# Patient Record
Sex: FEMALE | Race: BLACK OR AFRICAN AMERICAN | NOT HISPANIC OR LATINO
[De-identification: names, ages, dates, MRNs, and addresses within clinical notes are randomized per-mention and may not be internally consistent; named-entity substitution may affect disease eponyms.]

---

## 2017-03-06 PROBLEM — Z00.00 ENCOUNTER FOR PREVENTIVE HEALTH EXAMINATION: Status: ACTIVE | Noted: 2017-03-06

## 2017-05-16 ENCOUNTER — TRANSCRIPTION ENCOUNTER (OUTPATIENT)
Age: 42
End: 2017-05-16

## 2017-09-29 ENCOUNTER — TRANSCRIPTION ENCOUNTER (OUTPATIENT)
Age: 42
End: 2017-09-29

## 2017-10-03 ENCOUNTER — TRANSCRIPTION ENCOUNTER (OUTPATIENT)
Age: 42
End: 2017-10-03

## 2019-03-28 ENCOUNTER — OUTPATIENT (OUTPATIENT)
Dept: OUTPATIENT SERVICES | Facility: HOSPITAL | Age: 44
LOS: 1 days | Discharge: HOME | End: 2019-03-28

## 2019-03-28 DIAGNOSIS — R00.2 PALPITATIONS: ICD-10-CM

## 2019-03-28 DIAGNOSIS — Z00.00 ENCOUNTER FOR GENERAL ADULT MEDICAL EXAMINATION WITHOUT ABNORMAL FINDINGS: ICD-10-CM

## 2019-12-13 ENCOUNTER — TRANSCRIPTION ENCOUNTER (OUTPATIENT)
Age: 44
End: 2019-12-13

## 2020-04-25 ENCOUNTER — MESSAGE (OUTPATIENT)
Age: 45
End: 2020-04-25

## 2020-11-10 ENCOUNTER — TRANSCRIPTION ENCOUNTER (OUTPATIENT)
Age: 45
End: 2020-11-10

## 2021-10-04 ENCOUNTER — OUTPATIENT (OUTPATIENT)
Dept: OUTPATIENT SERVICES | Facility: HOSPITAL | Age: 46
LOS: 1 days | Discharge: HOME | End: 2021-10-04

## 2021-10-04 DIAGNOSIS — Z20.828 CONTACT WITH AND (SUSPECTED) EXPOSURE TO OTHER VIRAL COMMUNICABLE DISEASES: ICD-10-CM

## 2021-10-05 LAB — SARS-COV-2 RNA SPEC QL NAA+PROBE: SIGNIFICANT CHANGE UP

## 2022-01-31 ENCOUNTER — TRANSCRIPTION ENCOUNTER (OUTPATIENT)
Age: 47
End: 2022-01-31

## 2022-12-30 ENCOUNTER — EMERGENCY (EMERGENCY)
Facility: HOSPITAL | Age: 47
LOS: 0 days | Discharge: HOME | End: 2022-12-30
Attending: EMERGENCY MEDICINE | Admitting: EMERGENCY MEDICINE
Payer: MEDICAID

## 2022-12-30 VITALS
RESPIRATION RATE: 18 BRPM | HEART RATE: 80 BPM | TEMPERATURE: 96 F | SYSTOLIC BLOOD PRESSURE: 186 MMHG | WEIGHT: 209 LBS | DIASTOLIC BLOOD PRESSURE: 81 MMHG | OXYGEN SATURATION: 100 %
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
## 2022-12-30 DIAGNOSIS — Z88.0 ALLERGY STATUS TO PENICILLIN: ICD-10-CM
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
## 2022-12-30 DIAGNOSIS — S29.012A STRAIN OF MUSCLE AND TENDON OF BACK WALL OF THORAX, INITIAL ENCOUNTER: ICD-10-CM

## 2022-12-30 DIAGNOSIS — M54.6 PAIN IN THORACIC SPINE: ICD-10-CM

## 2022-12-30 PROCEDURE — 99283 EMERGENCY DEPT VISIT LOW MDM: CPT

## 2022-12-30 RX ORDER — TIZANIDINE 4 MG/1
1 TABLET ORAL
Qty: 30 | Refills: 0
Start: 2022-12-30 | End: 2023-01-08

## 2022-12-30 NOTE — ED PROVIDER NOTE - PATIENT PORTAL LINK FT
You can access the FollowMyHealth Patient Portal offered by [facility] by registering at the following website: [email or web address]. By joining [employer]’s FollowMyHealth portal, you will also be able to view your health information using other applications (apps) compatible with our system.

## 2022-12-30 NOTE — ED ADULT NURSE NOTE - HAVE YOU RECEIVED AT LEAST TWO PFIZER AND/OR MODERNA VACCINATIONS (IN ANY COMBINATION) AND/OR ONE JOHNSON & JOHNSON VACCINATION?
74 yr old female with hx of HTN, HLD, SVT on amlodopine presents with 10-12 hours of palpitations and mild shortness of breath.  PLaced in CDU due to being noted she was in afib w RVR.  She spontaneously converted.  Pt has no complaints today.  PLaced in CDU for echo.  Echo returned normal    pt stable for d/c Yes

## 2022-12-30 NOTE — ED PROVIDER NOTE - NSFOLLOWUPCLINICS_GEN_ALL_ED_FT
[facility]  Physical Therapy  Multiple Location  [place]   Phone: [phone]  Fax:   Follow Up Time: Routine

## 2022-12-30 NOTE — ED PROVIDER NOTE - OBJECTIVE STATEMENT
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  Worse with certain movements.  No direct trauma.

## 2022-12-30 NOTE — ED PROVIDER NOTE - PHYSICAL EXAMINATION
Vital Signs: I have reviewed the initial vital signs.  Constitutional: well-nourished, appears stated age, no acute distress  Cardiovascular: regular rate, regular rhythm, well-perfused extremities  Respiratory: unlabored respiratory effort, clear to auscultation bilaterally  MSK: nontender spine  Psychiatric: appropriate mood, appropriate affect

## 2023-01-11 ENCOUNTER — EMERGENCY (EMERGENCY)
Facility: HOSPITAL | Age: 48
LOS: 0 days | Discharge: HOME | End: 2023-01-11
Attending: EMERGENCY MEDICINE | Admitting: EMERGENCY MEDICINE
Payer: MEDICAID

## 2023-01-11 VITALS
HEART RATE: 82 BPM | OXYGEN SATURATION: 98 % | RESPIRATION RATE: 18 BRPM | TEMPERATURE: 97 F | SYSTOLIC BLOOD PRESSURE: 214 MMHG | DIASTOLIC BLOOD PRESSURE: 106 MMHG

## 2023-01-11 VITALS
HEART RATE: 68 BPM | SYSTOLIC BLOOD PRESSURE: 194 MMHG | TEMPERATURE: 97 F | OXYGEN SATURATION: 100 % | RESPIRATION RATE: 18 BRPM | DIASTOLIC BLOOD PRESSURE: 91 MMHG

## 2023-01-11 DIAGNOSIS — Z86.16 PERSONAL HISTORY OF COVID-19: ICD-10-CM

## 2023-01-11 DIAGNOSIS — R06.02 SHORTNESS OF BREATH: ICD-10-CM

## 2023-01-11 DIAGNOSIS — R07.81 PLEURODYNIA: ICD-10-CM

## 2023-01-11 DIAGNOSIS — S22.040A WEDGE COMPRESSION FRACTURE OF FOURTH THORACIC VERTEBRA, INITIAL ENCOUNTER FOR CLOSED FRACTURE: ICD-10-CM

## 2023-01-11 DIAGNOSIS — R07.89 OTHER CHEST PAIN: ICD-10-CM

## 2023-01-11 DIAGNOSIS — M54.6 PAIN IN THORACIC SPINE: ICD-10-CM

## 2023-01-11 DIAGNOSIS — Z86.711 PERSONAL HISTORY OF PULMONARY EMBOLISM: ICD-10-CM

## 2023-01-11 DIAGNOSIS — F17.200 NICOTINE DEPENDENCE, UNSPECIFIED, UNCOMPLICATED: ICD-10-CM

## 2023-01-11 DIAGNOSIS — Z88.0 ALLERGY STATUS TO PENICILLIN: ICD-10-CM
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
## 2023-01-11 LAB
ALBUMIN SERPL ELPH-MCNC: 4.9 G/DL — SIGNIFICANT CHANGE UP (ref 3.5–5.2)
ALP SERPL-CCNC: 103 U/L — SIGNIFICANT CHANGE UP (ref 30–115)
ALT FLD-CCNC: 18 U/L — SIGNIFICANT CHANGE UP (ref 0–41)
ANION GAP SERPL CALC-SCNC: 12 MMOL/L — SIGNIFICANT CHANGE UP (ref 7–14)
AST SERPL-CCNC: 22 U/L — SIGNIFICANT CHANGE UP (ref 0–41)
BASOPHILS # BLD AUTO: 0.05 K/UL — SIGNIFICANT CHANGE UP (ref 0–0.2)
BASOPHILS NFR BLD AUTO: 0.6 % — SIGNIFICANT CHANGE UP (ref 0–1)
BILIRUB SERPL-MCNC: 0.4 MG/DL — SIGNIFICANT CHANGE UP (ref 0.2–1.2)
BUN SERPL-MCNC: 9 MG/DL — LOW (ref 10–20)
CALCIUM SERPL-MCNC: 9.8 MG/DL — SIGNIFICANT CHANGE UP (ref 8.4–10.4)
CHLORIDE SERPL-SCNC: 101 MMOL/L — SIGNIFICANT CHANGE UP (ref 98–110)
CO2 SERPL-SCNC: 29 MMOL/L — SIGNIFICANT CHANGE UP (ref 17–32)
CREAT SERPL-MCNC: 0.7 MG/DL — SIGNIFICANT CHANGE UP (ref 0.7–1.5)
D DIMER BLD IA.RAPID-MCNC: 320 NG/ML DDU — HIGH
EGFR: 107 ML/MIN/1.73M2 — SIGNIFICANT CHANGE UP
EOSINOPHIL # BLD AUTO: 0.14 K/UL — SIGNIFICANT CHANGE UP (ref 0–0.7)
EOSINOPHIL NFR BLD AUTO: 1.8 % — SIGNIFICANT CHANGE UP (ref 0–8)
GLUCOSE SERPL-MCNC: 86 MG/DL — SIGNIFICANT CHANGE UP (ref 70–99)
HCG SERPL QL: NEGATIVE — SIGNIFICANT CHANGE UP
HCT VFR BLD CALC: 38.1 % — SIGNIFICANT CHANGE UP (ref 37–47)
HGB BLD-MCNC: 13.1 G/DL — SIGNIFICANT CHANGE UP (ref 12–16)
IMM GRANULOCYTES NFR BLD AUTO: 0.5 % — HIGH (ref 0.1–0.3)
LYMPHOCYTES # BLD AUTO: 1.8 K/UL — SIGNIFICANT CHANGE UP (ref 1.2–3.4)
LYMPHOCYTES # BLD AUTO: 22.5 % — SIGNIFICANT CHANGE UP (ref 20.5–51.1)
MCHC RBC-ENTMCNC: 30 PG — SIGNIFICANT CHANGE UP (ref 27–31)
MCHC RBC-ENTMCNC: 34.4 G/DL — SIGNIFICANT CHANGE UP (ref 32–37)
MCV RBC AUTO: 87.4 FL — SIGNIFICANT CHANGE UP (ref 81–99)
MONOCYTES # BLD AUTO: 0.59 K/UL — SIGNIFICANT CHANGE UP (ref 0.1–0.6)
MONOCYTES NFR BLD AUTO: 7.4 % — SIGNIFICANT CHANGE UP (ref 1.7–9.3)
NEUTROPHILS # BLD AUTO: 5.37 K/UL — SIGNIFICANT CHANGE UP (ref 1.4–6.5)
NEUTROPHILS NFR BLD AUTO: 67.2 % — SIGNIFICANT CHANGE UP (ref 42.2–75.2)
NRBC # BLD: 0 /100 WBCS — SIGNIFICANT CHANGE UP (ref 0–0)
NT-PROBNP SERPL-SCNC: 371 PG/ML — HIGH (ref 0–300)
PLATELET # BLD AUTO: 179 K/UL — SIGNIFICANT CHANGE UP (ref 130–400)
POTASSIUM SERPL-MCNC: 4.1 MMOL/L — SIGNIFICANT CHANGE UP (ref 3.5–5)
POTASSIUM SERPL-SCNC: 4.1 MMOL/L — SIGNIFICANT CHANGE UP (ref 3.5–5)
PROT SERPL-MCNC: 7.1 G/DL — SIGNIFICANT CHANGE UP (ref 6–8)
RBC # BLD: 4.36 M/UL — SIGNIFICANT CHANGE UP (ref 4.2–5.4)
RBC # FLD: 12.9 % — SIGNIFICANT CHANGE UP (ref 11.5–14.5)
SODIUM SERPL-SCNC: 142 MMOL/L — SIGNIFICANT CHANGE UP (ref 135–146)
TROPONIN T SERPL-MCNC: <0.01 NG/ML — SIGNIFICANT CHANGE UP
WBC # BLD: 7.99 K/UL — SIGNIFICANT CHANGE UP (ref 4.8–10.8)
WBC # FLD AUTO: 7.99 K/UL — SIGNIFICANT CHANGE UP (ref 4.8–10.8)

## 2023-01-11 PROCEDURE — 71275 CT ANGIOGRAPHY CHEST: CPT | Mod: 26,MA

## 2023-01-11 PROCEDURE — 99285 EMERGENCY DEPT VISIT HI MDM: CPT

## 2023-01-11 PROCEDURE — 93010 ELECTROCARDIOGRAM REPORT: CPT

## 2023-01-11 PROCEDURE — 71045 X-RAY EXAM CHEST 1 VIEW: CPT | Mod: 26

## 2023-01-11 RX ORDER — IBUPROFEN 200 MG
1 TABLET ORAL
Qty: 21 | Refills: 0
Start: 2023-01-11 | End: 2023-01-17

## 2023-01-11 RX ORDER — LIDOCAINE 4 G/100G
1 CREAM TOPICAL ONCE
Refills: 0 | Status: COMPLETED | OUTPATIENT
Start: 2023-01-11 | End: 2023-01-11

## 2023-01-11 RX ORDER — ONDANSETRON 8 MG/1
4 TABLET, FILM COATED ORAL ONCE
Refills: 0 | Status: DISCONTINUED | OUTPATIENT
Start: 2023-01-11 | End: 2023-01-11

## 2023-01-11 RX ORDER — KETOROLAC TROMETHAMINE 30 MG/ML
30 SYRINGE (ML) INJECTION ONCE
Refills: 0 | Status: DISCONTINUED | OUTPATIENT
Start: 2023-01-11 | End: 2023-01-11

## 2023-01-11 RX ORDER — METHOCARBAMOL 500 MG/1
1 TABLET, FILM COATED ORAL
Qty: 21 | Refills: 0
Start: 2023-01-11 | End: 2023-01-17

## 2023-01-11 RX ORDER — LIDOCAINE 4 G/100G
1 CREAM TOPICAL
Qty: 10 | Refills: 0
Start: 2023-01-11 | End: 2023-01-20

## 2023-01-11 RX ORDER — METHOCARBAMOL 500 MG/1
1500 TABLET, FILM COATED ORAL ONCE
Refills: 0 | Status: COMPLETED | OUTPATIENT
Start: 2023-01-11 | End: 2023-01-11

## 2023-01-11 RX ORDER — MORPHINE SULFATE 50 MG/1
4 CAPSULE, EXTENDED RELEASE ORAL ONCE
Refills: 0 | Status: DISCONTINUED | OUTPATIENT
Start: 2023-01-11 | End: 2023-01-11

## 2023-01-11 RX ADMIN — LIDOCAINE 1 PATCH: 4 CREAM TOPICAL at 12:19

## 2023-01-11 RX ADMIN — METHOCARBAMOL 1500 MILLIGRAM(S): 500 TABLET, FILM COATED ORAL at 12:19

## 2023-01-11 RX ADMIN — Medication 30 MILLIGRAM(S): at 12:19

## 2023-01-11 NOTE — ED PROVIDER NOTE - OBJECTIVE STATEMENT
47-year-old female with history of PE  after COVID presents to the ED complaining of mid upper back pain after doing handstand while doing yoga about a month ago.  Patient states pain has been getting worse and difficult sitting up straight.  Patient also complaining of some shortness of breath and pressure to the chest.  No numbness tingling weakness leg pain or leg swelling.

## 2023-01-11 NOTE — ED PROVIDER NOTE - NS ED ATTENDING STATEMENT MOD
This was a shared visit with the LEON. I reviewed and verified the documentation and independently performed the documented:

## 2023-01-11 NOTE — ED PROVIDER NOTE - CONSIDERATION OF ADMISSION OBSERVATION
Pt with R scapular/back pain, pleuritic in nature, no PE on CT, but found to have a T4 compression fracture.  seen by neurosurgery in ED and cleared for dc and outpt f/u.  Labs and EKG were ordered and reviewed.  Imaging was ordered and reviewed by me.  Appropriate medications for patient's presenting complaints were ordered and effects were reassessed.  Patient's records (prior hospital, ED visit, and/or nursing home notes if available) were reviewed.  Additional history was obtained from EMS, family, and/or PCP (where available).  Escalation to admission/observation was considered.  1) However patient feels much better and is comfortable with discharge.  Appropriate follow-up was arranged. Pt was given strict return to ED precautions and pt indicated that he/she understood. Consideration of Admission/Observation

## 2023-01-11 NOTE — ED PROVIDER NOTE - CARE PROVIDER_API CALL
Juhi Sloan)  Neurosurgery  [address], [address]  [place] [address]  Phone: [phone]  Fax: [phone]  Follow Up Time:

## 2023-01-11 NOTE — ED PROVIDER NOTE - CLINICAL SUMMARY MEDICAL DECISION MAKING FREE TEXT BOX
Pt with R scapular/back pain, pleuritic in nature, no PE on CT, but found to have a T4 compression fracture.  seen by neurosurgery in ED and cleared for dc and outpt f/u.  Labs and EKG were ordered and reviewed.  Imaging was ordered and reviewed by me.  Appropriate medications for patient's presenting complaints were ordered and effects were reassessed.  Patient's records (prior hospital, ED visit, and/or nursing home notes if available) were reviewed.  Additional history was obtained from EMS, family, and/or PCP (where available).  Escalation to admission/observation was considered.  1) However patient feels much better and is comfortable with discharge.  Appropriate follow-up was arranged. Pt was given strict return to ED precautions and pt indicated that he/she understood.

## 2023-01-11 NOTE — CONSULT NOTE ADULT - SUBJECTIVE AND OBJECTIVE BOX
HPI:  47-year-old female with history of PE  after COVID presents to the ED complaining of mid upper back pain after doing handstand while doing yoga about a month ago.  Patient states pain has been getting worse and difficult sitting up straight.  Patient also complaining of some shortness of breath and pressure to the chest.  No numbness tingling weakness leg pain or leg swelling    Pt seen and examined at chairside in ER.  Pt was doing a yoga pose 1 month ago involving being on her head and extending her leg.  Pt sts she felt immediate pain and has worsened since then.  She denies LE weakness, numbness, paresthesias or radiating pain.  Denies urinary/fecal incontinence.  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       PAST MEDICAL & SURGICAL HISTORY:  No pertinent past medical history        Imaging:   < from: CT Angio Chest PE Protocol w/ IV Cont (01.11.23 @ 14:35) >   Age-indeterminate compression fracture of the T4 vertebral   body-fracture line is seen, possibly acute or subacute. Correlate with   clinical history and exam.  < end of copied text >      Home Medications:      Allergies    penicillin (Unknown)    Intolerances        ROS:  [ x ] A ten-point review of systems is negative except as noted   [  ] Due to altered mental status/intubation, subjective information were not able to be obtained from the patient. History was obtained, to the extent possible, from review of the chart and collateral sources of information    MEDICATIONS  (STANDING):  ondansetron Injectable 4 milliGRAM(s) IV Push once    MEDICATIONS  (PRN):      ICU Vital Signs Last 24 Hrs  T(C): 36.2 (11 Jan 2023 14:37), Max: 36.2 (11 Jan 2023 14:37)  T(F): 97.2 (11 Jan 2023 14:37), Max: 97.2 (11 Jan 2023 14:37)  HR: 68 (11 Jan 2023 14:37) (68 - 82)  BP: 194/91 (11 Jan 2023 14:37) (194/91 - 214/106)  BP(mean): --  ABP: --  ABP(mean): --  RR: 18 (11 Jan 2023 14:37) (18 - 18)  SpO2: 100% (11 Jan 2023 14:37) (98% - 100%)    O2 Parameters below as of 11 Jan 2023 11:00  Patient On (Oxygen Delivery Method): room air            I&O's Detail      CBC Full  -  ( 11 Jan 2023 12:36 )  WBC Count : 7.99 K/uL  RBC Count : 4.36 M/uL  Hemoglobin : 13.1 g/dL  Hematocrit : 38.1 %  Platelet Count - Automated : 179 K/uL  Mean Cell Volume : 87.4 fL  Mean Cell Hemoglobin : 30.0 pg  Mean Cell Hemoglobin Concentration : 34.4 g/dL  Auto Neutrophil # : 5.37 K/uL  Auto Lymphocyte # : 1.80 K/uL  Auto Monocyte # : 0.59 K/uL  Auto Eosinophil # : 0.14 K/uL  Auto Basophil # : 0.05 K/uL  Auto Neutrophil % : 67.2 %  Auto Lymphocyte % : 22.5 %  Auto Monocyte % : 7.4 %  Auto Eosinophil % : 1.8 %  Auto Basophil % : 0.6 %    01-11    142  |  101  |  9<L>  ----------------------------<  86  4.1   |  29  |  0.7    Ca    9.8      11 Jan 2023 12:36    TPro  7.1  /  Alb  4.9  /  TBili  0.4  /  DBili  x   /  AST  22  /  ALT  18  /  AlkPhos  103  01-11    CARDIAC MARKERS ( 11 Jan 2023 12:36 )  x     / <0.01 ng/mL / x     / x     / x            Neuro exam:   AAOX3. Verbal function intact  follows commands  PERRL  Motor: MAEx4  5/5 power in b/l UE and LE  5/5 DF/PF  Sensation: intact to LT  mild TTP to thoracic spine at T4 level        Assessment/Plan:  No acute neurosurgical intervention  Cleared for OOB with Physical Therapy now  pain medications as needed  Can follow up as outpatient w/ Dr Chavez [phone] in 4-6 weeks  d/w attending

## 2023-01-11 NOTE — ED PROVIDER NOTE - PHYSICAL EXAMINATION
Vital Signs: I have reviewed the initial vital signs.  Constitutional: NAD, well-nourished, appears stated age, no acute distress.  HEENT: Airway patent, moist MM, no erythema/swelling/deformity of oral structures. EOMI, PERRLA.  CV: regular rate, regular rhythm, well-perfused extremities, 2+ b/l DP and radial pulses equal.  Lungs: BCTA, no increased WOB.  ABD: NT, ND, no guarding or rebound, no pulsatile mass, no hernias.   MSK: Neck supple, nontender, nl ROM, no stepoff. Chest nontender.  in thoracic spine; No flank pain. Ext nontender, nl rom, no deformity.   INTEG: Skin warm, dry, no rash.  NEURO: A&Ox3, normal strength, nl sensation throughout, normal speech.   PSYCH: Calm, cooperative, normal affect and interaction.

## 2023-01-11 NOTE — ED PROVIDER NOTE - PATIENT PORTAL LINK FT
You can access the FollowMyHealth Patient Portal offered by [facility] by registering at the following website: [email or web address]. By joining [employer]’s FollowMyHealth portal, you will also be able to view your health information using other applications (apps) compatible with our system.

## 2023-01-11 NOTE — ED PROVIDER NOTE - NS ED ROS FT
Constitutional: (-) fever  Eyes/ENT: (-) blurry vision, (-) epistaxis  Cardiovascular: (-) chest pain, (-) syncope  Respiratory: (-) cough, (+) shortness of breath  Gastrointestinal: (-) vomiting, (-) diarrhea  Musculoskeletal: (-) neck pain, (+) back pain, (-) joint pain  Integumentary: (-) rash, (-) edema  Neurological: (-) headache, (-) altered mental status  Psychiatric: (-) hallucinations  Allergic/Immunologic: (-) pruritus

## 2023-01-11 NOTE — ED PROVIDER NOTE - ATTENDING APP SHARED VISIT CONTRIBUTION OF CARE
47-year-old female with past medical history of PE during COVID, no longer on anticoagulation, was on Eliquis for 3 months, presents with 1 month of upper back pain.  Patient says that started while doing head standing yoga, felt a pinch in the right upper back.  Patient says improved over time with Motrin.  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  Patient went to ED at [facility] and was given a prescription for tizanidine without much relief.  Patient now says she is having pain on deep inspiration, and it is radiating to the chest.  No fever no chills.  Exam: nad, ncat, perrl, eomi, mmm, rrr, ctab, mildly tender in the right paravertebral muscle in the thoracic area, subscapular, abd soft, nt, nd aox3, no calf tenderness, no pitting edema impression: Patient with history of PE, now with right upper back pain radiating to the chest with pleuritic shortness of breath, will rule out PE, trial of appropriate dosage of muscle relaxant and NSAIDs, reassess

## 2023-01-19 ENCOUNTER — APPOINTMENT (OUTPATIENT)
Dept: NEUROSURGERY | Facility: CLINIC | Age: 48
End: 2023-01-19
Payer: MEDICAID

## 2023-01-19 VITALS — WEIGHT: 115 LBS | HEIGHT: 68 IN | BODY MASS INDEX: 17.43 KG/M2

## 2023-01-19 DIAGNOSIS — Z82.61 FAMILY HISTORY OF ARTHRITIS: ICD-10-CM

## 2023-01-19 DIAGNOSIS — Z78.9 OTHER SPECIFIED HEALTH STATUS: ICD-10-CM

## 2023-01-19 DIAGNOSIS — Z80.9 FAMILY HISTORY OF MALIGNANT NEOPLASM, UNSPECIFIED: ICD-10-CM

## 2023-01-19 PROCEDURE — 99204 OFFICE O/P NEW MOD 45 MIN: CPT

## 2023-01-31 RX ORDER — FAMOTIDINE 40 MG/1
40 TABLET, FILM COATED ORAL DAILY
Qty: 6 | Refills: 0 | Status: COMPLETED | COMMUNITY
Start: 2023-01-31 | End: 2023-02-06

## 2023-02-02 ENCOUNTER — APPOINTMENT (OUTPATIENT)
Dept: NEUROSURGERY | Facility: CLINIC | Age: 48
End: 2023-02-02

## 2023-02-02 NOTE — HISTORY OF PRESENT ILLNESS
[de-identified] : Mrs Sol presents to discuss her axial back pain due to a T4 traumatic compression fracture sustained during yoga exercise.  She is neurologically intact on exam and her pain has slowly improved over time.  CT angiogram demonstrates a A1 type wedge compression fracture localized to vertebral body of T4.  She has not undergone physical therapy or pain management.  There are no high risk features on CT.\par \par Will obtain MRI thoracic spine to further evaluate this compression fracture as it is a little odd for her to occur given her age and strong bone quality.  We will start her on physical therapy and provide stronger pain medications.  We will have her follow-up in several weeks to further evaluate the fracture morphology and see if her symptoms improve.  If there is no improvement then she will be candidate for a biopsy and kyphoplasty.  She expressed understanding is willing to proceed at this time.\par \par Neurologic Exam/Physical Exam\par Alert & Oriented x 3\par CN2-12 grossly intact\par Sensation intact to light touch, pain/temperature intact\par Vibration/proprioception Intact\par Motor strength 5/5 in all extremities\par 2+ patellar, 2+ brachioradialis \par

## 2023-02-07 ENCOUNTER — APPOINTMENT (OUTPATIENT)
Dept: NEUROSURGERY | Facility: CLINIC | Age: 48
End: 2023-02-07
Payer: MEDICAID

## 2023-02-07 VITALS — HEIGHT: 68 IN | WEIGHT: 220 LBS | BODY MASS INDEX: 33.34 KG/M2

## 2023-02-07 DIAGNOSIS — S22.008D: ICD-10-CM

## 2023-02-07 PROCEDURE — 99212 OFFICE O/P EST SF 10 MIN: CPT

## 2023-02-08 PROBLEM — S22.008D: Status: ACTIVE | Noted: 2023-01-19

## 2023-02-08 NOTE — HISTORY OF PRESENT ILLNESS
[FreeTextEntry1] : 47-year-old female presents back to the neurosurgery office today as a follow-up for a T4 traumatic compression fracture. \par \par Initially presented the end of January during which time an MRI of the thoracic spine was ordered given her age and strong bone quality, insurance denied, a peer to peer will be performed at the end of this week after which the patient will complete the diagnostic imaging. \par \par The T4 traumatic compression fracture was sustained during a yoga exercise. She has not been participating in physical therapy or consulted with pain management, both referrals to be provided to the patient today to start in the meantime. \par \par Ms. Sol has only been taking Tylenol PRN for pain and was educated that she can implement Ibuprofen to her regimen for anti-inflammatory properties. She did not want to take anything else at this time until consulting with pain management. She applies heat often which relieves her discomfort. Patient also performs home stretches. She denies any recent falls, no issues with gait. Denies changes to bowel or bladder habits. \par \par We have scheduled a telehealth visit for the end of next week to do a status check and hope at that time to be able to review MRI results, if completed. The plan remains that once physical therapy has begun and pain is under control,if there is no improvement then she will be candidate for a biopsy and kyphoplasty. She expressed understanding of the abovementioned and has denied any further questions at this time. \par \par Physical Exam:\par Constitutional: Well appearing, no distress\par HEENT: Normocephalic Atraumatic\par Psychiatric: Awake, alert, oriented to person, place, situation and time. Normal affect. Follows commands. \par Language: Speech is clear, fluent with good repetition & comprehension. No dysarthria. \par Pulmonary: No respiratory distress\par \par Neurologic:\par CN II-XII grossly intact; EOMI with no nystagmus. No facial asymmetry bilaterally, full eye closure strength bilaterally. Hearing grossly normal. Head turning & shoulder shrug intact, bilaterally. Tongue midline, normal movements, no atrophy. Smile symmetrical. \par Strength: Full strength in all major muscle groups, mildly limited to bilateral UE r/t pain\par Sensation: Full sensation to light touch in all extremities, V1-V3 sensation bilaterally intact. \par Motor: No pronator drift, muscle strength of bilateral UE and bilateral LE: 5/5. Normal muscle tone. \par Reflexes: DTR of biceps, knee and ankle normal\par No Clonus\par No Aldana's\par No Romberg's\par \par ROM\par \par Gait: No postural instability. Normal stance and walking without assistance. \par

## 2023-02-08 NOTE — REVIEW OF SYSTEMS
[Difficulty Walking] : no difficulty walking [Frequent Falls] : not falling [Eyesight Problems] : no eyesight problems [Loss Of Hearing] : no hearing loss [Shortness Of Breath] : no shortness of breath [Incontinence] : no incontinence

## 2023-02-08 NOTE — PHYSICAL EXAM
[General Appearance - Alert] : alert [Oriented To Time, Place, And Person] : oriented to person, place, and time [Abnormal Walk] : normal gait

## 2023-02-09 ENCOUNTER — NON-APPOINTMENT (OUTPATIENT)
Age: 48
End: 2023-02-09

## 2023-02-14 ENCOUNTER — APPOINTMENT (OUTPATIENT)
Dept: PAIN MANAGEMENT | Facility: CLINIC | Age: 48
End: 2023-02-14
Payer: MEDICAID

## 2023-02-14 VITALS — BODY MASS INDEX: 32.89 KG/M2 | WEIGHT: 217 LBS | HEIGHT: 68 IN

## 2023-02-14 DIAGNOSIS — Z86.79 PERSONAL HISTORY OF OTHER DISEASES OF THE CIRCULATORY SYSTEM: ICD-10-CM

## 2023-02-14 PROCEDURE — 99204 OFFICE O/P NEW MOD 45 MIN: CPT

## 2023-02-14 NOTE — PHYSICAL EXAM
[de-identified] : Gen: NAD\par Head: NC/AT\par Eyes: no glasses, no scleral icterus\par ENT: patient wearing a mask\par CV: RRR, S1 S2, no mrg\par Lungs: CTAB, nonlabored breathing\par Abd: soft, NT/ND\par Ext: +pain with lifting arms above her shoulders, otherwise full ROM in all extremities, no peripheral edema\par Back: +TTP along the midline of the mid to upper thoracic spine\par Neuro: CN intact\par UEs\par +5 L +5 R shoulder abduction\par +5 L +5 R arm abduction\par +5 L +5 R forearm flexion\par +5 L +5 R forearm extension\par +5 L +5 R finger flexion\par +5 L +5 R  strength\par LEs\par +5 L +5 R hip flexion\par +5 L +5 R leg extension\par +5 L +5 R leg flexion\par +5 L +5 R foot dorsiflexion\par +5 L +5 R foot plantarflexion\par +5 L +5 R EHL extension\par Psych: normal affect\par Skin: no visible lesions\par

## 2023-02-14 NOTE — HISTORY OF PRESENT ILLNESS
[FreeTextEntry1] : Patient is a 48 y/o woman presenting for a NPV for a history of chronic upper back pain. The patient states that, on December 10th, 2022, she was doing a head stand while doing yoga and felt an acute twinge of pain in the upper back. She states that she has had her pain get progressively worse over time. She has sought out treatment from the ED as well as from Dr. Chavez and has been prescribed muscle relaxants, NSAIDs, and a short course of steroids. She notes some short term relief of pain with the steroids but continues to have pain. Currently, she is taking ibuprofen 800mg TID prn and methocarbamol 750mg TID prn. At this point, the patient endorses having an aching pain across the upper back between her scapula that also radiates around to the anterior chest wall. Pain is worse with standing as well as with deep breaths and when lifting her arms above her head. Pain is improved with sitting. She also notes having significant difficulty with sleep initiation and maintenance 2/2 pain.

## 2023-02-14 NOTE — DISCUSSION/SUMMARY
[de-identified] : Patient is a 46 y/o woman presenting for a NPV for a history of chronic upper back pain. Patient found to have a T4 compression fracture on CT Chest (1/10/2023).\par \par Interventions: steroid taper (minimal efficacy), meloxicam (ineffective), methocarbamol, ibuprofen\par \par Plan:\par 1) Start gabapentin 300mg at bedtime with increase to TID\par 2) Continue methocarbamol 750mg prn\par 3) Continue ibuprofen 800mg TID prn\par 4) Consider thoracic interlaminar RAMOS if no improvement\par 5) Consider referral back to Dr. Chavez for kyphoplasty\par 6) RTC 4 weeks

## 2023-02-14 NOTE — DATA REVIEWED
[CT Scan] : CT scan [Report was reviewed and noted in the chart] : The report was reviewed and noted in the chart [I independently reviewed and interpreted images and report] : I independently reviewed and interpreted images and report [FreeTextEntry1] : CT Chest (1/11/2023)\par FINDINGS:\par \par PULMONARY EMBOLUS: Evaluation limited due to motion. No evidence of central or segmental pulmonary embolus..\par \par LUNGS: Evaluation of lungs limited due to motion. Small bilateral pleural effusions. No pneumothorax..\par \par MEDIASTINUM/THORACIC NODES: Top normal heart size. No pericardial effusion. No lymphadenopathy.\par \par VISUALIZED UPPER ABDOMEN: Unremarkable.\par \par BONES/SOFT TISSUES: Age-indeterminate compression fracture of the T4 vertebral body-fracture line is seen. Indeterminate lesion within the right proximal humerus, partially imaged.\par \par \par IMPRESSION:\par \par 1. Age-indeterminate compression fracture of the T4 vertebral body-fracture line is seen, possibly acute or subacute. Correlate with clinical history and exam.\par 2. Indeterminate lesion within the right proximal humerus, partially imaged. Evaluation with contrast-enhanced MRI of the humerus is needed.\par 3. No evidence of central or segmental pulmonary embolus.\par 4. Small bilateral pleural effusions.

## 2023-02-16 ENCOUNTER — OUTPATIENT (OUTPATIENT)
Dept: OUTPATIENT SERVICES | Facility: HOSPITAL | Age: 48
LOS: 1 days | End: 2023-02-16
Payer: MEDICAID

## 2023-02-16 DIAGNOSIS — M54.6 PAIN IN THORACIC SPINE: ICD-10-CM

## 2023-02-16 PROCEDURE — 72072 X-RAY EXAM THORAC SPINE 3VWS: CPT | Mod: 26

## 2023-02-16 PROCEDURE — 72072 X-RAY EXAM THORAC SPINE 3VWS: CPT

## 2023-02-17 DIAGNOSIS — M54.6 PAIN IN THORACIC SPINE: ICD-10-CM

## 2023-02-21 ENCOUNTER — NON-APPOINTMENT (OUTPATIENT)
Age: 48
End: 2023-02-21

## 2023-02-22 ENCOUNTER — NON-APPOINTMENT (OUTPATIENT)
Age: 48
End: 2023-02-22

## 2023-02-22 RX ORDER — IBUPROFEN 800 MG/1
800 TABLET, FILM COATED ORAL EVERY 8 HOURS
Qty: 42 | Refills: 0 | Status: COMPLETED | COMMUNITY
Start: 2023-02-22 | End: 2023-03-08

## 2023-02-23 ENCOUNTER — APPOINTMENT (OUTPATIENT)
Dept: NEUROSURGERY | Facility: CLINIC | Age: 48
End: 2023-02-23

## 2023-03-02 ENCOUNTER — APPOINTMENT (OUTPATIENT)
Dept: NEUROSURGERY | Facility: CLINIC | Age: 48
End: 2023-03-02

## 2023-03-15 ENCOUNTER — APPOINTMENT (OUTPATIENT)
Dept: PAIN MANAGEMENT | Facility: CLINIC | Age: 48
End: 2023-03-15

## 2023-03-24 ENCOUNTER — OUTPATIENT (OUTPATIENT)
Dept: OUTPATIENT SERVICES | Facility: HOSPITAL | Age: 48
LOS: 1 days | End: 2023-03-24
Payer: MEDICAID

## 2023-03-24 VITALS
TEMPERATURE: 98 F | SYSTOLIC BLOOD PRESSURE: 140 MMHG | WEIGHT: 225.97 LBS | OXYGEN SATURATION: 100 % | HEIGHT: 68 IN | HEART RATE: 77 BPM | RESPIRATION RATE: 16 BRPM | DIASTOLIC BLOOD PRESSURE: 80 MMHG

## 2023-03-24 DIAGNOSIS — S72.001D FRACTURE OF UNSPECIFIED PART OF NECK OF RIGHT FEMUR, SUBSEQUENT ENCOUNTER FOR CLOSED FRACTURE WITH ROUTINE HEALING: ICD-10-CM

## 2023-03-24 DIAGNOSIS — S72.001A FRACTURE OF UNSPECIFIED PART OF NECK OF RIGHT FEMUR, INITIAL ENCOUNTER FOR CLOSED FRACTURE: ICD-10-CM

## 2023-03-24 DIAGNOSIS — Z01.818 ENCOUNTER FOR OTHER PREPROCEDURAL EXAMINATION: ICD-10-CM

## 2023-03-24 LAB
ALBUMIN SERPL ELPH-MCNC: 4.6 G/DL — SIGNIFICANT CHANGE UP (ref 3.5–5.2)
ALP SERPL-CCNC: 102 U/L — SIGNIFICANT CHANGE UP (ref 30–115)
ALT FLD-CCNC: 11 U/L — SIGNIFICANT CHANGE UP (ref 0–41)
ANION GAP SERPL CALC-SCNC: 11 MMOL/L — SIGNIFICANT CHANGE UP (ref 7–14)
APTT BLD: 28.7 SEC — SIGNIFICANT CHANGE UP (ref 27–39.2)
AST SERPL-CCNC: 13 U/L — SIGNIFICANT CHANGE UP (ref 0–41)
BASOPHILS # BLD AUTO: 0.06 K/UL — SIGNIFICANT CHANGE UP (ref 0–0.2)
BASOPHILS NFR BLD AUTO: 0.7 % — SIGNIFICANT CHANGE UP (ref 0–1)
BILIRUB SERPL-MCNC: 0.2 MG/DL — SIGNIFICANT CHANGE UP (ref 0.2–1.2)
BLD GP AB SCN SERPL QL: SIGNIFICANT CHANGE UP
BUN SERPL-MCNC: 15 MG/DL — SIGNIFICANT CHANGE UP (ref 10–20)
CALCIUM SERPL-MCNC: 9.3 MG/DL — SIGNIFICANT CHANGE UP (ref 8.4–10.5)
CHLORIDE SERPL-SCNC: 104 MMOL/L — SIGNIFICANT CHANGE UP (ref 98–110)
CO2 SERPL-SCNC: 26 MMOL/L — SIGNIFICANT CHANGE UP (ref 17–32)
CREAT SERPL-MCNC: 0.6 MG/DL — LOW (ref 0.7–1.5)
EGFR: 111 ML/MIN/1.73M2 — SIGNIFICANT CHANGE UP
EOSINOPHIL # BLD AUTO: 0.29 K/UL — SIGNIFICANT CHANGE UP (ref 0–0.7)
EOSINOPHIL NFR BLD AUTO: 3.6 % — SIGNIFICANT CHANGE UP (ref 0–8)
GLUCOSE SERPL-MCNC: 87 MG/DL — SIGNIFICANT CHANGE UP (ref 70–99)
HCT VFR BLD CALC: 37.5 % — SIGNIFICANT CHANGE UP (ref 37–47)
HGB BLD-MCNC: 12.6 G/DL — SIGNIFICANT CHANGE UP (ref 12–16)
IMM GRANULOCYTES NFR BLD AUTO: 0.6 % — HIGH (ref 0.1–0.3)
INR BLD: 1.01 RATIO — SIGNIFICANT CHANGE UP (ref 0.65–1.3)
LYMPHOCYTES # BLD AUTO: 2.29 K/UL — SIGNIFICANT CHANGE UP (ref 1.2–3.4)
LYMPHOCYTES # BLD AUTO: 28.4 % — SIGNIFICANT CHANGE UP (ref 20.5–51.1)
MCHC RBC-ENTMCNC: 29.9 PG — SIGNIFICANT CHANGE UP (ref 27–31)
MCHC RBC-ENTMCNC: 33.6 G/DL — SIGNIFICANT CHANGE UP (ref 32–37)
MCV RBC AUTO: 89.1 FL — SIGNIFICANT CHANGE UP (ref 81–99)
MONOCYTES # BLD AUTO: 0.75 K/UL — HIGH (ref 0.1–0.6)
MONOCYTES NFR BLD AUTO: 9.3 % — SIGNIFICANT CHANGE UP (ref 1.7–9.3)
NEUTROPHILS # BLD AUTO: 4.63 K/UL — SIGNIFICANT CHANGE UP (ref 1.4–6.5)
NEUTROPHILS NFR BLD AUTO: 57.4 % — SIGNIFICANT CHANGE UP (ref 42.2–75.2)
NRBC # BLD: 0 /100 WBCS — SIGNIFICANT CHANGE UP (ref 0–0)
PLATELET # BLD AUTO: 197 K/UL — SIGNIFICANT CHANGE UP (ref 130–400)
POTASSIUM SERPL-MCNC: 4.1 MMOL/L — SIGNIFICANT CHANGE UP (ref 3.5–5)
POTASSIUM SERPL-SCNC: 4.1 MMOL/L — SIGNIFICANT CHANGE UP (ref 3.5–5)
PROT SERPL-MCNC: 6.6 G/DL — SIGNIFICANT CHANGE UP (ref 6–8)
PROTHROM AB SERPL-ACNC: 11.5 SEC — SIGNIFICANT CHANGE UP (ref 9.95–12.87)
RBC # BLD: 4.21 M/UL — SIGNIFICANT CHANGE UP (ref 4.2–5.4)
RBC # FLD: 12.9 % — SIGNIFICANT CHANGE UP (ref 11.5–14.5)
SODIUM SERPL-SCNC: 141 MMOL/L — SIGNIFICANT CHANGE UP (ref 135–146)
WBC # BLD: 8.07 K/UL — SIGNIFICANT CHANGE UP (ref 4.8–10.8)
WBC # FLD AUTO: 8.07 K/UL — SIGNIFICANT CHANGE UP (ref 4.8–10.8)

## 2023-03-24 PROCEDURE — 80053 COMPREHEN METABOLIC PANEL: CPT

## 2023-03-24 PROCEDURE — 87640 STAPH A DNA AMP PROBE: CPT

## 2023-03-24 PROCEDURE — 85610 PROTHROMBIN TIME: CPT

## 2023-03-24 PROCEDURE — 85025 COMPLETE CBC W/AUTO DIFF WBC: CPT

## 2023-03-24 PROCEDURE — 93005 ELECTROCARDIOGRAM TRACING: CPT

## 2023-03-24 PROCEDURE — 86900 BLOOD TYPING SEROLOGIC ABO: CPT

## 2023-03-24 PROCEDURE — 93010 ELECTROCARDIOGRAM REPORT: CPT

## 2023-03-24 PROCEDURE — 85730 THROMBOPLASTIN TIME PARTIAL: CPT

## 2023-03-24 PROCEDURE — 99214 OFFICE O/P EST MOD 30 MIN: CPT | Mod: 25

## 2023-03-24 PROCEDURE — 36415 COLL VENOUS BLD VENIPUNCTURE: CPT

## 2023-03-24 PROCEDURE — 86901 BLOOD TYPING SEROLOGIC RH(D): CPT

## 2023-03-24 PROCEDURE — 87641 MR-STAPH DNA AMP PROBE: CPT

## 2023-03-24 PROCEDURE — 86850 RBC ANTIBODY SCREEN: CPT

## 2023-03-24 RX ORDER — MELOXICAM 15 MG/1
1 TABLET ORAL
Qty: 0 | Refills: 0 | DISCHARGE

## 2023-03-24 NOTE — H&P PST ADULT - HISTORY OF PRESENT ILLNESS
CC: pt did a head stand dec 10, 2022 after restarting yoga and immediately felt the pain; constant stabbing pain makes her feel like she's getting chest pain; strongly advised pt to go to ER if CP worsens;; pain is 10/10; takes ibuprofen 800 and meloxicam 15    Mallamapti: 4    FOS: 1-2 Before injury    Anesthesia Alert  mallamapti 4-Difficult Airway  NO--History of neck surgery or radiation  NO--Limited ROM of neck  NO--History of Malignant hyperthermia  NO--Personal or family history of Pseudocholinesterase deficiency.  NO--Prior Anesthesia Complication  NO--Latex Allergy  NO--Loose teeth  NO--History of Rheumatoid Arthritis  NO--NAMITA  NO--Bleeding risk  NO--Other_____

## 2023-03-24 NOTE — H&P PST ADULT - NSICDXPASTMEDICALHX_GEN_ALL_CORE_FT
PAST MEDICAL HISTORY:  2019 novel coronavirus disease (COVID-19)     Back pain due to injury     H/O blood clots

## 2023-03-24 NOTE — H&P PST ADULT - REASON FOR ADMISSION
Patient is a _48____ year old ___female presenting to [facility] in preparation for __thoracic 4 kyphoplasty____ on __04/03/23____ under __general_____ anesthesia by  __Kathy_______ .

## 2023-03-24 NOTE — H&P PST ADULT - NSANTHOSAYNRD_GEN_A_CORE
No. NAMITA screening performed.  STOP BANG Legend: 0-2 = LOW Risk; 3-4 = INTERMEDIATE Risk; 5-8 = HIGH Risk

## 2023-03-25 DIAGNOSIS — Z01.818 ENCOUNTER FOR OTHER PREPROCEDURAL EXAMINATION: ICD-10-CM

## 2023-03-25 DIAGNOSIS — S72.001A FRACTURE OF UNSPECIFIED PART OF NECK OF RIGHT FEMUR, INITIAL ENCOUNTER FOR CLOSED FRACTURE: ICD-10-CM

## 2023-03-25 LAB — MRSA PCR RESULT.: NEGATIVE — SIGNIFICANT CHANGE UP

## 2023-03-30 ENCOUNTER — APPOINTMENT (OUTPATIENT)
Dept: NEUROSURGERY | Facility: CLINIC | Age: 48
End: 2023-03-30
Payer: MEDICAID

## 2023-03-30 DIAGNOSIS — S22.040A WEDGE COMPRESSION FRACTURE OF FOURTH THORACIC VERTEBRA, INITIAL ENCOUNTER FOR CLOSED FRACTURE: ICD-10-CM

## 2023-03-30 PROCEDURE — 99214 OFFICE O/P EST MOD 30 MIN: CPT | Mod: 57

## 2023-03-31 PROBLEM — S22.040A: Status: ACTIVE | Noted: 2023-02-09

## 2023-03-31 NOTE — HISTORY OF PRESENT ILLNESS
[de-identified] : Mrs Sol presents for follow-up visit to discuss her upper thoracic back pain after sustaining a traumatic T4 compression fracture and go over her updated thoracic MRI. This demonstrates vertebra planar deformity at T4 which is worsened than her previous CT thoracic. Her symptoms have improved, however. We discussed the results of the MRI together in office and the surgical plan, which will include a biopsy to obtain tissue for diagnosis to r/o pathologic fracture. She is otherwise neurologically intact and in good spirits. Indications/risks/benefits/alternatives were discussed with her and she is ready to proceed.

## 2023-04-05 PROBLEM — Z86.718 PERSONAL HISTORY OF OTHER VENOUS THROMBOSIS AND EMBOLISM: Chronic | Status: ACTIVE | Noted: 2023-03-24

## 2023-04-05 PROBLEM — U07.1 COVID-19: Chronic | Status: ACTIVE | Noted: 2023-03-24

## 2023-04-05 PROBLEM — M54.9 DORSALGIA, UNSPECIFIED: Chronic | Status: ACTIVE | Noted: 2023-03-24

## 2023-04-07 ENCOUNTER — APPOINTMENT (OUTPATIENT)
Dept: CARDIOLOGY | Facility: CLINIC | Age: 48
End: 2023-04-07

## 2023-04-10 ENCOUNTER — RX RENEWAL (OUTPATIENT)
Age: 48
End: 2023-04-10

## 2023-04-17 ENCOUNTER — APPOINTMENT (OUTPATIENT)
Dept: CARDIOLOGY | Facility: CLINIC | Age: 48
End: 2023-04-17
Payer: MEDICAID

## 2023-04-17 ENCOUNTER — RESULT CHARGE (OUTPATIENT)
Age: 48
End: 2023-04-17

## 2023-04-17 VITALS
HEIGHT: 68 IN | HEART RATE: 84 BPM | TEMPERATURE: 97 F | BODY MASS INDEX: 33.8 KG/M2 | DIASTOLIC BLOOD PRESSURE: 99 MMHG | SYSTOLIC BLOOD PRESSURE: 166 MMHG | WEIGHT: 223 LBS

## 2023-04-17 DIAGNOSIS — R94.31 ABNORMAL ELECTROCARDIOGRAM [ECG] [EKG]: ICD-10-CM

## 2023-04-17 PROCEDURE — 93000 ELECTROCARDIOGRAM COMPLETE: CPT

## 2023-04-17 PROCEDURE — 99204 OFFICE O/P NEW MOD 45 MIN: CPT | Mod: 25

## 2023-04-17 RX ORDER — METHYLPREDNISOLONE 4 MG/1
4 TABLET ORAL
Qty: 1 | Refills: 0 | Status: DISCONTINUED | COMMUNITY
Start: 2023-01-31 | End: 2023-04-17

## 2023-04-17 RX ORDER — METHOCARBAMOL 750 MG/1
750 TABLET, FILM COATED ORAL 3 TIMES DAILY
Qty: 90 | Refills: 0 | Status: DISCONTINUED | COMMUNITY
Start: 2023-01-19 | End: 2023-04-17

## 2023-04-17 RX ORDER — GABAPENTIN 300 MG/1
300 CAPSULE ORAL 3 TIMES DAILY
Qty: 90 | Refills: 0 | Status: DISCONTINUED | COMMUNITY
Start: 2023-02-14 | End: 2023-04-17

## 2023-04-17 RX ORDER — MELOXICAM 15 MG/1
15 TABLET ORAL
Qty: 30 | Refills: 1 | Status: DISCONTINUED | COMMUNITY
Start: 2023-01-19 | End: 2023-04-17

## 2023-04-17 NOTE — ASSESSMENT
[FreeTextEntry1] : Ms. TIA SOL is a 48 year old woman with PMHx of T4 compression fracture who is presenting for pre-op evaluation prior to T4 kyphoplasty.\par \par \par Based on the RCRI, Ms Sol is class I (0 point, 3.9%) risk of major CV event from her upcoming surgical procedure. She has good funcational capacity and can perform >4 METs without limitations. Thus, no further cardiac testing is recommended prior to her surgical procedure. \par \par Her BP is elevated today, which is likely multifactorial related to undiagnosed HTN, whitecoat syndrome, pain, and NSAID use. Recommend RTC in 2 months to further assess BP and determine need for anti-hypertensives. \par \par Jose Dejesus MD, FACC\par Non-Invasive Cardiology\par [facility]\par [facility]\par [address]\par Office: [phone]\par

## 2023-04-17 NOTE — REASON FOR VISIT
[Symptom and Test Evaluation] : symptom and test evaluation [FreeTextEntry1] : Ms. TIA MCCORD is a 48 year old woman with PMHx of T4 fracture who is presenting for pre-op evaluation prior to T4 kyphoplasty. She has no chest pain, SOB or palpitations. She has back pain related to her fracture. She is able to walk up 2+ flights of stairs without limitations. \par \par She has Fhx of HTN and DM. She does not smoke.\par \par She takes her BP at home- typically 140s/80s.

## 2023-04-20 ENCOUNTER — NON-APPOINTMENT (OUTPATIENT)
Age: 48
End: 2023-04-20

## 2023-06-22 ENCOUNTER — APPOINTMENT (OUTPATIENT)
Dept: CARDIOLOGY | Facility: CLINIC | Age: 48
End: 2023-06-22
Payer: MEDICAID

## 2023-06-22 VITALS
BODY MASS INDEX: 32.58 KG/M2 | DIASTOLIC BLOOD PRESSURE: 70 MMHG | SYSTOLIC BLOOD PRESSURE: 146 MMHG | HEIGHT: 68 IN | WEIGHT: 215 LBS | HEART RATE: 67 BPM

## 2023-06-22 DIAGNOSIS — Z01.810 ENCOUNTER FOR PREPROCEDURAL CARDIOVASCULAR EXAMINATION: ICD-10-CM

## 2023-06-22 DIAGNOSIS — I10 ESSENTIAL (PRIMARY) HYPERTENSION: ICD-10-CM

## 2023-06-22 DIAGNOSIS — R03.0 ELEVATED BLOOD-PRESSURE READING, W/OUT DIAGNOSIS OF HYPERTENSION: ICD-10-CM

## 2023-06-22 DIAGNOSIS — Z83.3 FAMILY HISTORY OF DIABETES MELLITUS: ICD-10-CM

## 2023-06-22 DIAGNOSIS — I51.7 CARDIOMEGALY: ICD-10-CM

## 2023-06-22 DIAGNOSIS — Z82.49 FAMILY HISTORY OF ISCHEMIC HEART DISEASE AND OTHER DISEASES OF THE CIRCULATORY SYSTEM: ICD-10-CM

## 2023-06-22 PROCEDURE — 99214 OFFICE O/P EST MOD 30 MIN: CPT | Mod: 25

## 2023-06-22 PROCEDURE — 93000 ELECTROCARDIOGRAM COMPLETE: CPT

## 2023-06-22 RX ORDER — AMLODIPINE BESYLATE 2.5 MG/1
2.5 TABLET ORAL DAILY
Qty: 30 | Refills: 11 | Status: ACTIVE | COMMUNITY
Start: 2023-06-22 | End: 1900-01-01

## 2023-06-22 NOTE — ASSESSMENT
[FreeTextEntry1] : Ms. TIA MCCORD is a 48 year old woman with PMHx of T4 fracture and borderline HTN who is presenting for follow up. \par \par #HTN\par #LVH on ECG\par \par -Reviewed ECG, labs\par -Start amlodipine 2.5 daily given elevated BP. She will continue with lifestyle modifications, and attempt to improve BP as well.\par -Order TTE to rule out structural changes given LVH on ECG\par -Encouraged her to follow up with NSGY. She will get second opinion. \par \par Jose Dejesus MD, FACC\par Non-Invasive Cardiology\par [facility]\par [facility]\par [address]\par Office: [phone]\par

## 2023-06-22 NOTE — REASON FOR VISIT
[Hypertension] : hypertension [FreeTextEntry1] : Ms. TIA MCCORD is a 48 year old woman with PMHx of T4 fracture and borderline HTN who is presenting for follow up. She was previously seen for pre-op prior to kyphoplasty, but felt anxious and did not show up for her procedure. Her back pain has lessened. She has no chest pain, SOB or palpitations. \par \par She has Fhx of HTN and DM. She does not smoke.

## 2023-06-22 NOTE — ASSESSMENT
[FreeTextEntry1] : Ms. TIA MCCORD is a 48 year old woman with PMHx of T4 fracture and borderline HTN who is presenting for follow up. \par \par #HTN\par #LVH on ECG\par \par -Reviewed ECG, labs\par -Start amlodipine 2.5 daily given elevated BP. She will continue with lifestyle modifications, and attempt to improve BP as well.\par -Order TTE to rule out structural changes given LVH on ECG\par -Encouraged her to follow up with NSGY. She will get second opinion. \par \par Jose Dejesus MD, FACC\par Non-Invasive Cardiology\par [facility]\par [facility]\par [address]\par Office: [phone]\par

## 2023-08-24 ENCOUNTER — APPOINTMENT (OUTPATIENT)
Dept: CARDIOLOGY | Facility: CLINIC | Age: 48
End: 2023-08-24

## 2023-10-19 ENCOUNTER — APPOINTMENT (OUTPATIENT)
Dept: CARDIOLOGY | Facility: CLINIC | Age: 48
End: 2023-10-19

## 2025-03-10 ENCOUNTER — APPOINTMENT (OUTPATIENT)
Dept: ORTHOPEDIC SURGERY | Facility: CLINIC | Age: 50
End: 2025-03-10
Payer: OTHER MISCELLANEOUS

## 2025-03-10 VITALS — WEIGHT: 204 LBS | HEIGHT: 68 IN | BODY MASS INDEX: 30.92 KG/M2

## 2025-03-10 PROCEDURE — 99204 OFFICE O/P NEW MOD 45 MIN: CPT

## 2025-03-21 ENCOUNTER — APPOINTMENT (OUTPATIENT)
Dept: ORTHOPEDIC SURGERY | Facility: CLINIC | Age: 50
End: 2025-03-21
Payer: OTHER MISCELLANEOUS

## 2025-03-21 DIAGNOSIS — S32.050S WEDGE COMPRESSION FRACTURE OF FIFTH LUMBAR VERTEBRA, SEQUELA: ICD-10-CM

## 2025-03-21 PROCEDURE — 99214 OFFICE O/P EST MOD 30 MIN: CPT

## 2025-03-21 RX ORDER — METHOCARBAMOL 750 MG/1
750 TABLET, FILM COATED ORAL
Qty: 30 | Refills: 2 | Status: ACTIVE | COMMUNITY
Start: 2025-03-21 | End: 1900-01-01

## 2025-03-21 RX ORDER — NAPROXEN 500 MG/1
500 TABLET ORAL 3 TIMES DAILY
Qty: 30 | Refills: 2 | Status: ACTIVE | COMMUNITY
Start: 2025-03-21 | End: 1900-01-01

## 2025-04-21 ENCOUNTER — APPOINTMENT (OUTPATIENT)
Dept: ORTHOPEDIC SURGERY | Facility: CLINIC | Age: 50
End: 2025-04-21

## 2025-04-23 ENCOUNTER — NON-APPOINTMENT (OUTPATIENT)
Age: 50
End: 2025-04-23

## 2025-05-02 ENCOUNTER — APPOINTMENT (OUTPATIENT)
Dept: ORTHOPEDIC SURGERY | Facility: CLINIC | Age: 50
End: 2025-05-02
Payer: OTHER MISCELLANEOUS

## 2025-05-02 DIAGNOSIS — S32.050S WEDGE COMPRESSION FRACTURE OF FIFTH LUMBAR VERTEBRA, SEQUELA: ICD-10-CM

## 2025-05-02 DIAGNOSIS — S22.040A WEDGE COMPRESSION FRACTURE OF FOURTH THORACIC VERTEBRA, INITIAL ENCOUNTER FOR CLOSED FRACTURE: ICD-10-CM

## 2025-05-02 PROCEDURE — 99213 OFFICE O/P EST LOW 20 MIN: CPT

## 2025-05-02 RX ORDER — IBUPROFEN 800 MG/1
800 TABLET, FILM COATED ORAL 3 TIMES DAILY
Qty: 90 | Refills: 1 | Status: ACTIVE | COMMUNITY
Start: 2025-05-02 | End: 1900-01-01

## 2025-05-30 ENCOUNTER — APPOINTMENT (OUTPATIENT)
Dept: ORTHOPEDIC SURGERY | Facility: CLINIC | Age: 50
End: 2025-05-30

## 2025-07-12 ENCOUNTER — APPOINTMENT (OUTPATIENT)
Dept: MRI IMAGING | Facility: CLINIC | Age: 50
End: 2025-07-12
Payer: OTHER MISCELLANEOUS

## 2025-07-12 PROCEDURE — 72148 MRI LUMBAR SPINE W/O DYE: CPT

## 2025-07-12 PROCEDURE — 72146 MRI CHEST SPINE W/O DYE: CPT

## 2025-07-17 ENCOUNTER — APPOINTMENT (OUTPATIENT)
Dept: ORTHOPEDIC SURGERY | Facility: CLINIC | Age: 50
End: 2025-07-17
Payer: OTHER MISCELLANEOUS

## 2025-07-17 PROCEDURE — 99214 OFFICE O/P EST MOD 30 MIN: CPT
